# Patient Record
Sex: MALE | URBAN - METROPOLITAN AREA
[De-identification: names, ages, dates, MRNs, and addresses within clinical notes are randomized per-mention and may not be internally consistent; named-entity substitution may affect disease eponyms.]

---

## 2023-02-15 ENCOUNTER — OFFICE VISIT (OUTPATIENT)
Facility: LOCATION | Age: 43
End: 2023-02-15
Payer: COMMERCIAL

## 2023-02-15 DIAGNOSIS — T15.02XA FOREIGN BODY IN CORNEA, LEFT EYE: Primary | ICD-10-CM

## 2023-02-15 PROCEDURE — 92012 INTRM OPH EXAM EST PATIENT: CPT

## 2023-02-15 NOTE — IMPRESSION/PLAN
Impression: Foreign body in cornea, left eye: T15.02XA. Plan: Small buried metallic FB. Unable to remove with cotton tip applicator or spud, opted not force FB out with spud. Pt to start ofloxicin QID x1 week, sample given to pt.  RTC 1 week for f/u, or sooner; prn.

## 2023-02-22 ENCOUNTER — OFFICE VISIT (OUTPATIENT)
Facility: LOCATION | Age: 43
End: 2023-02-22
Payer: COMMERCIAL

## 2023-02-22 DIAGNOSIS — T15.02XA FOREIGN BODY IN CORNEA, LEFT EYE: Primary | ICD-10-CM

## 2023-02-22 PROCEDURE — 92012 INTRM OPH EXAM EST PATIENT: CPT

## 2023-02-22 NOTE — IMPRESSION/PLAN
Impression: Foreign body in cornea, left eye: T15.02XA. Plan: Resolved. Pt can d/c ofloxicin drops. Pt to notify clinic if symptoms return. Expressed the importance of wearing properly fitting safety glasses. Pt express understanding. Monitor.